# Patient Record
Sex: MALE | Race: WHITE | NOT HISPANIC OR LATINO | URBAN - METROPOLITAN AREA
[De-identification: names, ages, dates, MRNs, and addresses within clinical notes are randomized per-mention and may not be internally consistent; named-entity substitution may affect disease eponyms.]

---

## 2021-07-21 ENCOUNTER — OFFICE VISIT (OUTPATIENT)
Dept: URBAN - METROPOLITAN AREA CLINIC 27 | Facility: CLINIC | Age: 56
End: 2021-07-21

## 2021-07-21 PROBLEM — 422587007 NAUSEA: Status: ACTIVE | Noted: 2021-07-21

## 2021-07-21 PROBLEM — 14760008 CONSTIPATION: Status: ACTIVE | Noted: 2021-07-21

## 2021-07-22 ENCOUNTER — LAB OUTSIDE AN ENCOUNTER (OUTPATIENT)
Dept: URBAN - METROPOLITAN AREA CLINIC 121 | Facility: CLINIC | Age: 56
End: 2021-07-22

## 2021-07-22 LAB — ZZ-GE-UNK: NOT DETECTED

## 2021-09-15 ENCOUNTER — OFFICE VISIT (OUTPATIENT)
Dept: URBAN - METROPOLITAN AREA SURGERY CENTER 7 | Facility: SURGERY CENTER | Age: 56
End: 2021-09-15

## 2021-10-05 ENCOUNTER — OFFICE VISIT (OUTPATIENT)
Dept: URBAN - METROPOLITAN AREA CLINIC 27 | Facility: CLINIC | Age: 56
End: 2021-10-05

## 2021-10-05 PROBLEM — 3696007 FUNCTIONAL DYSPEPSIA: Status: ACTIVE | Noted: 2021-10-05

## 2021-10-12 ENCOUNTER — OFFICE VISIT (OUTPATIENT)
Dept: URBAN - METROPOLITAN AREA SURGERY CENTER 7 | Facility: SURGERY CENTER | Age: 56
End: 2021-10-12

## 2021-10-13 ENCOUNTER — LAB OUTSIDE AN ENCOUNTER (OUTPATIENT)
Dept: URBAN - METROPOLITAN AREA CLINIC 121 | Facility: CLINIC | Age: 56
End: 2021-10-13

## 2021-10-13 LAB
A/G RATIO: (no result)
ALBUMIN: 4.4
ALK PHOS: 56
AMYLASE: 20
BASOPH COUNT: (no result)
BASOPHIL %: 1.2
BG RANDOM: 136
BILI TOTAL: 1.3
BUN/CREAT: (no result)
BUN: 20
CALCIUM: 9.2
CHLORIDE: 103
CO2: 28
CREATININE: 1.15
EOS COUNT: (no result)
EOSINOPHIL %: 2
GLOBULIN TOT: (no result)
HCT: 36.9
HGB: 11.1
HGBA1C: (no result)
LYMPHS %: 22.9
MCH: 17.2
MCHC: (no result)
MCV: 57
MONOCYTE %: 8.9
MONOSCT AUTO: (no result)
PLATELETS: (no result)
PMN %: 65
POTASSIUM: 4.7
PROTEIN, TOT: 6.4
RBC: (no result)
RDW: 24.5
SGOT (AST): 24
SGPT (ALT): 25
WBC: (no result)
ZZ-GE-UNK: (no result)
ZZ-GE-UNK: (no result)

## 2021-10-26 ENCOUNTER — OFFICE VISIT (OUTPATIENT)
Dept: URBAN - METROPOLITAN AREA MEDICAL CENTER 17 | Facility: MEDICAL CENTER | Age: 56
End: 2021-10-26

## 2021-10-26 PROBLEM — 414916001 OBESITY: Status: ACTIVE | Noted: 2021-10-26

## 2022-04-30 ENCOUNTER — TELEPHONE ENCOUNTER (OUTPATIENT)
Dept: URBAN - METROPOLITAN AREA CLINIC 121 | Facility: CLINIC | Age: 57
End: 2022-04-30

## 2022-04-30 RX ORDER — ESOMEPRAZOLE MAGNESIUM 20 MG
1 PO QD CAPSULE,DELAYED RELEASE (ENTERIC COATED) ORAL
OUTPATIENT
Start: 2009-03-05 | End: 2013-10-24

## 2022-04-30 RX ORDER — LOSARTAN POTASSIUM 50 MG/1
TABLET, FILM COATED ORAL
OUTPATIENT
Start: 2008-09-08 | End: 2013-10-24

## 2022-04-30 RX ORDER — AZELASTINE HYDROCHLORIDE AND FLUTICASONE PROPIONATE 137; 50 UG/1; UG/1
BID SPRAY, METERED NASAL
OUTPATIENT
Start: 2015-06-08 | End: 2019-11-18

## 2022-04-30 RX ORDER — PANTOPRAZOLE SODIUM 40 MG/1
TAKE 1 TABLET BY MOUTH TWICE A DAY TABLET, DELAYED RELEASE ORAL
OUTPATIENT
Start: 2020-11-30 | End: 2021-09-10

## 2022-04-30 RX ORDER — CHOLESTYRAMINE 4 G/9G
MIX 1 PACK WITH JUICE AND DRINK TWICE A DAY FOR 2 WEEKS POWDER, FOR SUSPENSION ORAL
OUTPATIENT
Start: 2015-09-04 | End: 2019-11-18

## 2022-04-30 RX ORDER — POLYETHYLENE GLYCOL 3350, SODIUM SULFATE, SODIUM CHLORIDE, POTASSIUM CHLORIDE, ASCORBIC ACID, SODIUM ASCORBATE 7.5-2.691G
MIX AND USE AS DIRECTED KIT ORAL
OUTPATIENT
Start: 2013-10-24 | End: 2015-06-08

## 2022-04-30 RX ORDER — DEXLANSOPRAZOLE 60 MG/1
TAKE 1 CAPSULE PO QAM CAPSULE, DELAYED RELEASE ORAL
OUTPATIENT
Start: 2015-06-08 | End: 2019-11-18

## 2022-04-30 RX ORDER — SODIUM SULFATE, POTASSIUM SULFATE, MAGNESIUM SULFATE 17.5; 3.13; 1.6 G/ML; G/ML; G/ML
MIX AND USE AS DIRECTED. MAY SUB FOR GOLYTELY SOLUTION, CONCENTRATE ORAL
OUTPATIENT
Start: 2019-11-18

## 2022-04-30 RX ORDER — DEXLANSOPRAZOLE 60 MG/1
1 CAPSULE PO QAM CAPSULE, DELAYED RELEASE ORAL
OUTPATIENT
Start: 2013-10-24

## 2022-04-30 RX ORDER — FAMOTIDINE 10 MG
1 TABLET PO QHS TABLET ORAL
OUTPATIENT
Start: 2017-06-23

## 2022-04-30 RX ORDER — SAXAGLIPTIN 2.5 MG/1
QD TABLET, FILM COATED ORAL
OUTPATIENT
Start: 2013-11-18 | End: 2015-06-08

## 2022-04-30 RX ORDER — CHOLESTYRAMINE 4 G/9G
MIX 1 PACK WITH JUICE AND DRINK TWICE A DAY FOR 2 WEEKS POWDER, FOR SUSPENSION ORAL
OUTPATIENT
Start: 2015-09-04

## 2022-04-30 RX ORDER — LOSARTAN POTASSIUM 50 MG/1
TABLET, FILM COATED ORAL
OUTPATIENT
Start: 2008-09-08

## 2022-04-30 RX ORDER — FAMOTIDINE 10 MG
TAKE 1 TABLET TWICE A DAY TABLET ORAL
OUTPATIENT
Start: 2018-08-05 | End: 2019-11-18

## 2022-04-30 RX ORDER — PANTOPRAZOLE SODIUM 40 MG/1
1 TABLET PO BID TABLET, DELAYED RELEASE ORAL
OUTPATIENT
Start: 2019-11-18

## 2022-04-30 RX ORDER — SITAGLIPTIN 25 MG/1
TABLET, FILM COATED ORAL
OUTPATIENT
Start: 2008-09-08 | End: 2013-10-24

## 2022-04-30 RX ORDER — SUCRALFATE 1 G/10ML
2 TABLESPOONS PO BID X2 WEEKS SUSPENSION ORAL
OUTPATIENT
Start: 2015-07-20 | End: 2019-11-18

## 2022-04-30 RX ORDER — SODIUM SULFATE, POTASSIUM SULFATE, MAGNESIUM SULFATE 17.5; 3.13; 1.6 G/ML; G/ML; G/ML
MIX AND USE AS DIRECTED. MAY SUB FOR GOLYTELY SOLUTION, CONCENTRATE ORAL
OUTPATIENT
Start: 2019-11-18 | End: 2021-02-25

## 2022-04-30 RX ORDER — DEXLANSOPRAZOLE 60 MG/1
TAKE 1 CAPSULE PO QAM CAPSULE, DELAYED RELEASE ORAL
OUTPATIENT
Start: 2015-06-08

## 2022-04-30 RX ORDER — PANTOPRAZOLE SODIUM 40 MG/1
TAKE 1 TABLET PO QD TABLET, DELAYED RELEASE ORAL
OUTPATIENT
Start: 2017-06-02 | End: 2019-11-18

## 2022-04-30 RX ORDER — PANTOPRAZOLE SODIUM 40 MG/1
TAKE 1 TABLET PO QD TABLET, DELAYED RELEASE ORAL
OUTPATIENT
Start: 2017-06-02

## 2022-04-30 RX ORDER — SUCRALFATE 1 G/10ML
2 TABLESPOONS PO BID X2 WEEKS SUSPENSION ORAL
OUTPATIENT
Start: 2015-07-20

## 2022-04-30 RX ORDER — FAMOTIDINE 10 MG
1 TABLET PO BID TABLET ORAL
OUTPATIENT
Start: 2017-06-23 | End: 2019-11-18

## 2022-04-30 RX ORDER — SUCRALFATE 1 G/10ML
TAKE 1 TABLESPOON BY MOUTH THREE TIMES A DAY AS DIRECTED SUSPENSION ORAL
OUTPATIENT
Start: 2021-10-12 | End: 2021-11-09

## 2022-04-30 RX ORDER — DEXLANSOPRAZOLE 60 MG/1
TAKE 1 CAPSULE PO QD CAPSULE, DELAYED RELEASE ORAL
OUTPATIENT
Start: 2014-01-24

## 2022-04-30 RX ORDER — DEXLANSOPRAZOLE 60 MG/1
1 CAPSULE PO QAM CAPSULE, DELAYED RELEASE ORAL
OUTPATIENT
Start: 2013-10-24 | End: 2015-06-08

## 2022-04-30 RX ORDER — POLYETHYLENE GLYCOL 3350, SODIUM SULFATE, SODIUM CHLORIDE, POTASSIUM CHLORIDE, ASCORBIC ACID, SODIUM ASCORBATE 7.5-2.691G
MIX AND USE AS DIRECTED KIT ORAL
OUTPATIENT
Start: 2013-10-24

## 2022-04-30 RX ORDER — LOSARTAN POTASSIUM 100 MG/1
QD TABLET, FILM COATED ORAL
OUTPATIENT
Start: 2013-10-24 | End: 2019-11-18

## 2022-04-30 RX ORDER — GLIPIZIDE 5 MG/1
TID TABLET, EXTENDED RELEASE ORAL
OUTPATIENT
Start: 2013-10-24

## 2022-04-30 RX ORDER — PANTOPRAZOLE SODIUM 40 MG/1
TAKE 1 TABLET BY MOUTH TWICE A DAY TABLET, DELAYED RELEASE ORAL
OUTPATIENT
Start: 2020-11-30

## 2022-04-30 RX ORDER — CETIRIZINE HYDROCHLORIDE 10 MG/1
TABLET, FILM COATED ORAL
OUTPATIENT
Start: 2008-09-08 | End: 2013-10-24

## 2022-04-30 RX ORDER — DICYCLOMINE HYDROCHLORIDE 10 MG/1
TAKE 1 CAPSULE BY MOUTH THREE TIMES A DAY CAPSULE ORAL
OUTPATIENT
Start: 2021-09-03 | End: 2022-01-01

## 2022-04-30 RX ORDER — GLIPIZIDE 5 MG/1
TID TABLET, EXTENDED RELEASE ORAL
OUTPATIENT
Start: 2013-10-24 | End: 2015-06-08

## 2022-04-30 RX ORDER — CETIRIZINE HYDROCHLORIDE 10 MG/1
TABLET, FILM COATED ORAL
OUTPATIENT
Start: 2008-09-08

## 2022-04-30 RX ORDER — LOSARTAN POTASSIUM 100 MG/1
QD TABLET, FILM COATED ORAL
OUTPATIENT
Start: 2013-10-24

## 2022-04-30 RX ORDER — FAMOTIDINE 10 MG
1 TABLET PO QHS TABLET ORAL
OUTPATIENT
Start: 2015-06-08 | End: 2019-11-18

## 2022-04-30 RX ORDER — FAMOTIDINE 10 MG
1 TABLET PO QHS TABLET ORAL
OUTPATIENT
Start: 2015-06-08

## 2022-04-30 RX ORDER — SITAGLIPTIN 25 MG/1
TABLET, FILM COATED ORAL
OUTPATIENT
Start: 2008-09-08

## 2022-04-30 RX ORDER — ESOMEPRAZOLE MAGNESIUM 20 MG
1 PO QD CAPSULE,DELAYED RELEASE (ENTERIC COATED) ORAL
OUTPATIENT
Start: 2009-03-05

## 2022-04-30 RX ORDER — SAXAGLIPTIN 2.5 MG/1
QD TABLET, FILM COATED ORAL
OUTPATIENT
Start: 2013-11-18

## 2022-04-30 RX ORDER — AZELASTINE HYDROCHLORIDE AND FLUTICASONE PROPIONATE 137; 50 UG/1; UG/1
BID SPRAY, METERED NASAL
OUTPATIENT
Start: 2015-06-08

## 2022-04-30 RX ORDER — FAMOTIDINE 10 MG
TAKE 1 TABLET TWICE A DAY TABLET ORAL
OUTPATIENT
Start: 2018-08-05

## 2022-05-01 ENCOUNTER — TELEPHONE ENCOUNTER (OUTPATIENT)
Dept: URBAN - METROPOLITAN AREA CLINIC 121 | Facility: CLINIC | Age: 57
End: 2022-05-01

## 2022-05-01 RX ORDER — LINACLOTIDE 145 UG/1
1 CAPSULE PO QAM 30 MINS BEFORE EATING CAPSULE, GELATIN COATED ORAL
Status: ACTIVE | COMMUNITY
Start: 2021-07-21

## 2022-05-01 RX ORDER — FAMOTIDINE 20 MG/1
TABLET ORAL
Status: ACTIVE | COMMUNITY
Start: 2019-11-18

## 2022-05-01 RX ORDER — MONTELUKAST 10 MG/1
QD TABLET, FILM COATED ORAL
Status: ACTIVE | COMMUNITY
Start: 2015-06-08

## 2022-05-01 RX ORDER — INSULIN GLARGINE 100 [IU]/ML
60 UNITS QD INJECTION, SOLUTION SUBCUTANEOUS
Status: ACTIVE | COMMUNITY
Start: 2015-06-08

## 2022-05-01 RX ORDER — SEMAGLUTIDE 1.34 MG/ML
INJECTION, SOLUTION SUBCUTANEOUS
Status: ACTIVE | COMMUNITY
Start: 2021-07-21

## 2022-05-01 RX ORDER — POLYETHYLENE GLYCOL 3350, SODIUM SULFATE, SODIUM CHLORIDE, POTASSIUM CHLORIDE, ASCORBIC ACID, SODIUM ASCORBATE 140-9-5.2G
MIX AND USE DIRECTED.   USE CO-PAY CARD BELOW:  BIN 019158  PCN: CNRX GROUP: AC68037003 ID: 39275793763 KIT ORAL
Status: ACTIVE | COMMUNITY
Start: 2021-08-19

## 2022-05-01 RX ORDER — OLMESARTAN MEDOXOMIL 40 MG/1
TABLET, FILM COATED ORAL
Status: ACTIVE | COMMUNITY
Start: 2019-11-18

## 2022-05-01 RX ORDER — GABAPENTIN 300 MG/1
BID CAPSULE ORAL
Status: ACTIVE | COMMUNITY
Start: 2015-06-08

## 2022-05-01 RX ORDER — POLYETHYLENE GLYCOL 3350, SODIUM SULFATE, SODIUM CHLORIDE, POTASSIUM CHLORIDE, ASCORBIC ACID, SODIUM ASCORBATE 140-9-5.2G
MIX AND USE DIRECTED.   USE CO-PAY CARD BELOW:  BIN 019158  PCN: CNRX GROUP: AC68037003 ID: 39275793763 KIT ORAL
Status: ACTIVE | COMMUNITY
Start: 2021-07-21

## 2022-05-01 RX ORDER — ALLOPURINOL 100 MG/1
TABLET ORAL
Status: ACTIVE | COMMUNITY
Start: 2019-11-18

## 2022-05-01 RX ORDER — ROSUVASTATIN CALCIUM 10 MG
TABLET ORAL
Status: ACTIVE | COMMUNITY
Start: 2021-07-21

## 2022-05-01 RX ORDER — INSULIN GLARGINE 100 [IU]/ML
INJECTION, SOLUTION SUBCUTANEOUS
Status: ACTIVE | COMMUNITY
Start: 2021-07-21

## 2022-05-01 RX ORDER — OMEGA-3S/DHA/EPA/FISH OIL 980-1400MG
CAPSULE,DELAYED RELEASE (ENTERIC COATED) ORAL
Status: ACTIVE | COMMUNITY
Start: 2021-07-21

## 2022-05-01 RX ORDER — OMEPRAZOLE 10 MG/1
CAPSULE, DELAYED RELEASE ORAL
Status: ACTIVE | COMMUNITY
Start: 2019-11-18

## 2022-05-01 RX ORDER — CHLORHEXIDINE GLUCONATE 4 %
LIQUID (ML) TOPICAL
Status: ACTIVE | COMMUNITY
Start: 2021-07-21

## 2022-05-01 RX ORDER — YOHIMBE BARK 500 MG
CAPSULE ORAL
Status: ACTIVE | COMMUNITY
Start: 2021-07-21

## 2022-05-01 RX ORDER — DILTIAZEM HYDROCHLORIDE 240 MG/1
CAPSULE, EXTENDED RELEASE ORAL
Status: ACTIVE | COMMUNITY
Start: 2019-11-18

## 2022-05-01 RX ORDER — PANTOPRAZOLE SODIUM 40 MG/1
TAKE 1 TABLET BY MOUTH TWICE A DAY AS DIRECTED TABLET, DELAYED RELEASE ORAL
Status: ACTIVE | COMMUNITY
Start: 2021-09-10 | End: 2022-09-05

## 2022-05-01 RX ORDER — GLIMEPIRIDE 4 MG/1
TABLET ORAL
Status: ACTIVE | COMMUNITY
Start: 2019-11-18

## 2022-05-01 RX ORDER — FUROSEMIDE 20 MG/1
QD TABLET ORAL
Status: ACTIVE | COMMUNITY
Start: 2015-06-08

## 2022-05-01 RX ORDER — METFORMIN HCL 1000 MG/1
BID TABLET ORAL
Status: ACTIVE | COMMUNITY
Start: 2013-10-24

## 2022-11-28 ENCOUNTER — ERX REFILL RESPONSE (OUTPATIENT)
Dept: URBAN - METROPOLITAN AREA CLINIC 27 | Facility: CLINIC | Age: 57
End: 2022-11-28

## 2022-11-28 RX ORDER — PANTOPRAZOLE SODIUM 40 MG/1
TAKE 1 TABLET BY MOUTH TWICE A DAY AS DIRECTED TABLET, DELAYED RELEASE ORAL
Qty: 180 TABLET | Refills: 4 | OUTPATIENT

## 2022-11-28 RX ORDER — PANTOPRAZOLE SODIUM 40 MG/1
TAKE 1 TABLET BY MOUTH TWICE A DAY AS DIRECTED TABLET, DELAYED RELEASE ORAL
Qty: 180 TABLET | Refills: 3 | OUTPATIENT

## 2023-03-03 ENCOUNTER — TELEPHONE ENCOUNTER (OUTPATIENT)
Dept: URBAN - METROPOLITAN AREA CLINIC 27 | Facility: CLINIC | Age: 58
End: 2023-03-03

## 2023-05-01 ENCOUNTER — LAB OUTSIDE AN ENCOUNTER (OUTPATIENT)
Dept: URBAN - METROPOLITAN AREA CLINIC 27 | Facility: CLINIC | Age: 58
End: 2023-05-01

## 2023-05-01 ENCOUNTER — OFFICE VISIT (OUTPATIENT)
Dept: URBAN - METROPOLITAN AREA CLINIC 27 | Facility: CLINIC | Age: 58
End: 2023-05-01
Payer: COMMERCIAL

## 2023-05-01 ENCOUNTER — WEB ENCOUNTER (OUTPATIENT)
Dept: URBAN - METROPOLITAN AREA CLINIC 27 | Facility: CLINIC | Age: 58
End: 2023-05-01

## 2023-05-01 VITALS
BODY MASS INDEX: 38.5 KG/M2 | SYSTOLIC BLOOD PRESSURE: 124 MMHG | WEIGHT: 275 LBS | DIASTOLIC BLOOD PRESSURE: 54 MMHG | HEART RATE: 76 BPM | HEIGHT: 71 IN

## 2023-05-01 DIAGNOSIS — E11.9 TYPE 2 DIABETES MELLITUS WITHOUT COMPLICATIONS: ICD-10-CM

## 2023-05-01 DIAGNOSIS — K21.9 GASTRO-ESOPHAGEAL REFLUX DISEASE WITHOUT ESOPHAGITIS: ICD-10-CM

## 2023-05-01 DIAGNOSIS — I10 ESSENTIAL (PRIMARY) HYPERTENSION: ICD-10-CM

## 2023-05-01 PROCEDURE — 99214 OFFICE O/P EST MOD 30 MIN: CPT | Performed by: INTERNAL MEDICINE

## 2023-05-01 RX ORDER — SEMAGLUTIDE 1.34 MG/ML
INJECTION, SOLUTION SUBCUTANEOUS
Status: ACTIVE | COMMUNITY
Start: 2021-07-21

## 2023-05-01 RX ORDER — ROSUVASTATIN CALCIUM 10 MG
TABLET ORAL
Status: ACTIVE | COMMUNITY
Start: 2021-07-21

## 2023-05-01 RX ORDER — INSULIN GLARGINE 100 [IU]/ML
INJECTION, SOLUTION SUBCUTANEOUS
Status: ACTIVE | COMMUNITY
Start: 2021-07-21

## 2023-05-01 RX ORDER — OLMESARTAN MEDOXOMIL 40 MG/1
TABLET, FILM COATED ORAL
Status: ACTIVE | COMMUNITY
Start: 2019-11-18

## 2023-05-01 RX ORDER — GABAPENTIN 300 MG/1
BID CAPSULE ORAL
Status: ACTIVE | COMMUNITY
Start: 2015-06-08

## 2023-05-01 RX ORDER — POLYETHYLENE GLYCOL 3350, SODIUM SULFATE, SODIUM CHLORIDE, POTASSIUM CHLORIDE, ASCORBIC ACID, SODIUM ASCORBATE 140-9-5.2G
MIX AND USE DIRECTED.   USE CO-PAY CARD BELOW:  BIN 019158  PCN: CNRX GROUP: AC68037003 ID: 39275793763 KIT ORAL
Status: ACTIVE | COMMUNITY
Start: 2021-08-19

## 2023-05-01 RX ORDER — DILTIAZEM HYDROCHLORIDE 240 MG/1
CAPSULE, EXTENDED RELEASE ORAL
Status: ACTIVE | COMMUNITY
Start: 2019-11-18

## 2023-05-01 RX ORDER — FAMOTIDINE 20 MG/1
TABLET ORAL
Status: ACTIVE | COMMUNITY
Start: 2019-11-18

## 2023-05-01 RX ORDER — METFORMIN HCL 1000 MG/1
BID TABLET ORAL
Status: ACTIVE | COMMUNITY
Start: 2013-10-24

## 2023-05-01 RX ORDER — MONTELUKAST 10 MG/1
QD TABLET, FILM COATED ORAL
Status: ACTIVE | COMMUNITY
Start: 2015-06-08

## 2023-05-01 RX ORDER — PANTOPRAZOLE SODIUM 40 MG/1
TAKE 1 TABLET BY MOUTH TWICE A DAY AS DIRECTED TABLET, DELAYED RELEASE ORAL
Qty: 180 TABLET | Refills: 3 | Status: ACTIVE | COMMUNITY

## 2023-05-01 RX ORDER — FUROSEMIDE 20 MG/1
QD TABLET ORAL
Status: ACTIVE | COMMUNITY
Start: 2015-06-08

## 2023-05-01 RX ORDER — CHLORHEXIDINE GLUCONATE 4 %
LIQUID (ML) TOPICAL
Status: ACTIVE | COMMUNITY
Start: 2021-07-21

## 2023-05-01 RX ORDER — PANTOPRAZOLE SODIUM 40 MG/1
1 TABLET TABLET, DELAYED RELEASE ORAL TWICE A DAY
Qty: 120 TABLET | Refills: 3 | OUTPATIENT
Start: 2023-05-01

## 2023-05-01 RX ORDER — YOHIMBE BARK 500 MG
CAPSULE ORAL
Status: ACTIVE | COMMUNITY
Start: 2021-07-21

## 2023-05-01 RX ORDER — LINACLOTIDE 145 UG/1
1 CAPSULE PO QAM 30 MINS BEFORE EATING CAPSULE, GELATIN COATED ORAL
Status: ACTIVE | COMMUNITY
Start: 2021-07-21

## 2023-05-01 RX ORDER — OMEGA-3S/DHA/EPA/FISH OIL 980-1400MG
CAPSULE,DELAYED RELEASE (ENTERIC COATED) ORAL
Status: ACTIVE | COMMUNITY
Start: 2021-07-21

## 2023-05-01 RX ORDER — INSULIN GLARGINE 100 [IU]/ML
60 UNITS QD INJECTION, SOLUTION SUBCUTANEOUS
Status: ACTIVE | COMMUNITY
Start: 2015-06-08

## 2023-05-01 RX ORDER — OMEPRAZOLE 10 MG/1
CAPSULE, DELAYED RELEASE ORAL
Status: ACTIVE | COMMUNITY
Start: 2019-11-18

## 2023-05-01 RX ORDER — GLIMEPIRIDE 4 MG/1
TABLET ORAL
Status: DISCONTINUED | COMMUNITY
Start: 2019-11-18

## 2023-05-01 RX ORDER — ALLOPURINOL 100 MG/1
TABLET ORAL
Status: ACTIVE | COMMUNITY
Start: 2019-11-18

## 2023-05-01 NOTE — HPI-TODAY'S VISIT:
Mr. Chris presents today for routine follow-up of his heartburn. He has run out of medications and is having severe heartburn symptoms.  He currently takes pantoprazole twice daily.  Otherwise, he denies any other gastrointestinal issues.  He reports it has been a while since his last endoscopic evaluation.

## 2023-05-16 ENCOUNTER — CLAIMS CREATED FROM THE CLAIM WINDOW (OUTPATIENT)
Dept: URBAN - METROPOLITAN AREA CLINIC 4 | Facility: CLINIC | Age: 58
End: 2023-05-16
Payer: COMMERCIAL

## 2023-05-16 ENCOUNTER — TELEPHONE ENCOUNTER (OUTPATIENT)
Dept: URBAN - METROPOLITAN AREA CLINIC 27 | Facility: CLINIC | Age: 58
End: 2023-05-16

## 2023-05-16 ENCOUNTER — WEB ENCOUNTER (OUTPATIENT)
Dept: URBAN - METROPOLITAN AREA CLINIC 27 | Facility: CLINIC | Age: 58
End: 2023-05-16

## 2023-05-16 ENCOUNTER — CLAIMS CREATED FROM THE CLAIM WINDOW (OUTPATIENT)
Dept: URBAN - METROPOLITAN AREA SURGERY CENTER 7 | Facility: SURGERY CENTER | Age: 58
End: 2023-05-16

## 2023-05-16 ENCOUNTER — CLAIMS CREATED FROM THE CLAIM WINDOW (OUTPATIENT)
Dept: URBAN - METROPOLITAN AREA SURGERY CENTER 7 | Facility: SURGERY CENTER | Age: 58
End: 2023-05-16
Payer: COMMERCIAL

## 2023-05-16 DIAGNOSIS — K21.9 GASTRO-ESOPHAGEAL REFLUX DISEASE WITHOUT ESOPHAGITIS: ICD-10-CM

## 2023-05-16 DIAGNOSIS — K21.9 ACID REFLUX: ICD-10-CM

## 2023-05-16 DIAGNOSIS — R12 BURNING REFLUX: ICD-10-CM

## 2023-05-16 PROCEDURE — 43239 EGD BIOPSY SINGLE/MULTIPLE: CPT | Performed by: INTERNAL MEDICINE

## 2023-05-16 PROCEDURE — 88312 SPECIAL STAINS GROUP 1: CPT | Performed by: PATHOLOGY

## 2023-05-16 PROCEDURE — G8907 PT DOC NO EVENTS ON DISCHARG: HCPCS | Performed by: INTERNAL MEDICINE

## 2023-05-16 PROCEDURE — 88305 TISSUE EXAM BY PATHOLOGIST: CPT | Performed by: PATHOLOGY

## 2023-05-16 RX ORDER — GABAPENTIN 300 MG/1
BID CAPSULE ORAL
Status: ACTIVE | COMMUNITY
Start: 2015-06-08

## 2023-05-16 RX ORDER — METFORMIN HCL 1000 MG/1
BID TABLET ORAL
Status: ACTIVE | COMMUNITY
Start: 2013-10-24

## 2023-05-16 RX ORDER — INSULIN GLARGINE 100 [IU]/ML
INJECTION, SOLUTION SUBCUTANEOUS
Status: ACTIVE | COMMUNITY
Start: 2021-07-21

## 2023-05-16 RX ORDER — ALLOPURINOL 100 MG/1
TABLET ORAL
Status: ACTIVE | COMMUNITY
Start: 2019-11-18

## 2023-05-16 RX ORDER — CHLORHEXIDINE GLUCONATE 4 %
LIQUID (ML) TOPICAL
Status: ACTIVE | COMMUNITY
Start: 2021-07-21

## 2023-05-16 RX ORDER — SEMAGLUTIDE 1.34 MG/ML
INJECTION, SOLUTION SUBCUTANEOUS
Status: ACTIVE | COMMUNITY
Start: 2021-07-21

## 2023-05-16 RX ORDER — INSULIN GLARGINE 100 [IU]/ML
60 UNITS QD INJECTION, SOLUTION SUBCUTANEOUS
Status: ACTIVE | COMMUNITY
Start: 2015-06-08

## 2023-05-16 RX ORDER — POLYETHYLENE GLYCOL 3350, SODIUM SULFATE, SODIUM CHLORIDE, POTASSIUM CHLORIDE, ASCORBIC ACID, SODIUM ASCORBATE 140-9-5.2G
MIX AND USE DIRECTED.   USE CO-PAY CARD BELOW:  BIN 019158  PCN: CNRX GROUP: AC68037003 ID: 39275793763 KIT ORAL
Status: ACTIVE | COMMUNITY
Start: 2021-08-19

## 2023-05-16 RX ORDER — YOHIMBE BARK 500 MG
CAPSULE ORAL
Status: ACTIVE | COMMUNITY
Start: 2021-07-21

## 2023-05-16 RX ORDER — PANTOPRAZOLE SODIUM 40 MG/1
TAKE 1 TABLET BY MOUTH TWICE A DAY AS DIRECTED TABLET, DELAYED RELEASE ORAL
Qty: 180 TABLET | Refills: 3 | Status: ACTIVE | COMMUNITY

## 2023-05-16 RX ORDER — OMEGA-3S/DHA/EPA/FISH OIL 980-1400MG
CAPSULE,DELAYED RELEASE (ENTERIC COATED) ORAL
Status: ACTIVE | COMMUNITY
Start: 2021-07-21

## 2023-05-16 RX ORDER — FAMOTIDINE 20 MG/1
TABLET ORAL
Status: ACTIVE | COMMUNITY
Start: 2019-11-18

## 2023-05-16 RX ORDER — LINACLOTIDE 145 UG/1
1 CAPSULE PO QAM 30 MINS BEFORE EATING CAPSULE, GELATIN COATED ORAL
Status: ACTIVE | COMMUNITY
Start: 2021-07-21

## 2023-05-16 RX ORDER — DILTIAZEM HYDROCHLORIDE 240 MG/1
CAPSULE, EXTENDED RELEASE ORAL
Status: ACTIVE | COMMUNITY
Start: 2019-11-18

## 2023-05-16 RX ORDER — OMEPRAZOLE 10 MG/1
CAPSULE, DELAYED RELEASE ORAL
Status: ACTIVE | COMMUNITY
Start: 2019-11-18

## 2023-05-16 RX ORDER — MONTELUKAST 10 MG/1
QD TABLET, FILM COATED ORAL
Status: ACTIVE | COMMUNITY
Start: 2015-06-08

## 2023-05-16 RX ORDER — PANTOPRAZOLE SODIUM 40 MG/1
1 TABLET TABLET, DELAYED RELEASE ORAL TWICE A DAY
Qty: 120 TABLET | Refills: 3 | Status: ACTIVE | COMMUNITY
Start: 2023-05-01

## 2023-05-16 RX ORDER — PANTOPRAZOLE SODIUM 40 MG/1
1 TABLET TABLET, DELAYED RELEASE ORAL ONCE A DAY
Qty: 60 TABLET | Refills: 3 | OUTPATIENT
Start: 2023-05-16

## 2023-05-16 RX ORDER — PANTOPRAZOLE SODIUM 40 MG/1
1 TABLET TABLET, DELAYED RELEASE ORAL ONCE A DAY
Qty: 60 TABLET | Refills: 3
Start: 2023-05-16

## 2023-05-16 RX ORDER — FUROSEMIDE 20 MG/1
QD TABLET ORAL
Status: ACTIVE | COMMUNITY
Start: 2015-06-08

## 2023-05-16 RX ORDER — ROSUVASTATIN CALCIUM 10 MG
TABLET ORAL
Status: ACTIVE | COMMUNITY
Start: 2021-07-21

## 2023-05-16 RX ORDER — OLMESARTAN MEDOXOMIL 40 MG/1
TABLET, FILM COATED ORAL
Status: ACTIVE | COMMUNITY
Start: 2019-11-18

## 2023-05-22 ENCOUNTER — TELEPHONE ENCOUNTER (OUTPATIENT)
Dept: URBAN - METROPOLITAN AREA CLINIC 27 | Facility: CLINIC | Age: 58
End: 2023-05-22

## 2023-05-22 RX ORDER — PANTOPRAZOLE SODIUM 40 MG/1
1 TABLET TABLET, DELAYED RELEASE ORAL TWICE A DAY
Qty: 120 TABLET | Refills: 3 | OUTPATIENT
Start: 2023-05-22

## 2023-05-24 ENCOUNTER — TELEPHONE ENCOUNTER (OUTPATIENT)
Dept: URBAN - METROPOLITAN AREA CLINIC 27 | Facility: CLINIC | Age: 58
End: 2023-05-24

## 2023-05-24 RX ORDER — PANTOPRAZOLE SODIUM 40 MG/1
1 TABLET TABLET, DELAYED RELEASE ORAL ONCE A DAY
Qty: 60 TABLET | Refills: 3
Start: 2023-05-16

## 2023-07-10 ENCOUNTER — DASHBOARD ENCOUNTERS (OUTPATIENT)
Age: 58
End: 2023-07-10

## 2023-07-10 ENCOUNTER — OFFICE VISIT (OUTPATIENT)
Dept: URBAN - METROPOLITAN AREA CLINIC 27 | Facility: CLINIC | Age: 58
End: 2023-07-10
Payer: COMMERCIAL

## 2023-07-10 VITALS
DIASTOLIC BLOOD PRESSURE: 55 MMHG | SYSTOLIC BLOOD PRESSURE: 117 MMHG | WEIGHT: 270 LBS | HEIGHT: 71 IN | BODY MASS INDEX: 37.8 KG/M2 | HEART RATE: 73 BPM

## 2023-07-10 DIAGNOSIS — K21.9 GASTRO-ESOPHAGEAL REFLUX DISEASE WITHOUT ESOPHAGITIS: ICD-10-CM

## 2023-07-10 DIAGNOSIS — K31.89 RETAINED FOOD IN STOMACH: ICD-10-CM

## 2023-07-10 PROBLEM — 386211005: Status: ACTIVE | Noted: 2023-07-10

## 2023-07-10 PROCEDURE — 99214 OFFICE O/P EST MOD 30 MIN: CPT | Performed by: INTERNAL MEDICINE

## 2023-07-10 RX ORDER — POLYETHYLENE GLYCOL 3350, SODIUM SULFATE, SODIUM CHLORIDE, POTASSIUM CHLORIDE, ASCORBIC ACID, SODIUM ASCORBATE 140-9-5.2G
MIX AND USE DIRECTED.   USE CO-PAY CARD BELOW:  BIN 019158  PCN: CNRX GROUP: AC68037003 ID: 39275793763 KIT ORAL
Status: ACTIVE | COMMUNITY
Start: 2021-08-19

## 2023-07-10 RX ORDER — PANTOPRAZOLE SODIUM 40 MG/1
TAKE 1 TABLET BY MOUTH TWICE A DAY AS DIRECTED TABLET, DELAYED RELEASE ORAL
Qty: 180 TABLET | Refills: 3 | Status: ACTIVE | COMMUNITY

## 2023-07-10 RX ORDER — OLMESARTAN MEDOXOMIL 40 MG/1
TABLET, FILM COATED ORAL
Status: ACTIVE | COMMUNITY
Start: 2019-11-18

## 2023-07-10 RX ORDER — LINACLOTIDE 145 UG/1
1 CAPSULE PO QAM 30 MINS BEFORE EATING CAPSULE, GELATIN COATED ORAL
Status: ACTIVE | COMMUNITY
Start: 2021-07-21

## 2023-07-10 RX ORDER — PANTOPRAZOLE SODIUM 40 MG/1
1 TABLET TABLET, DELAYED RELEASE ORAL TWICE A DAY
Qty: 120 TABLET | Refills: 3 | Status: ACTIVE | COMMUNITY
Start: 2023-05-22

## 2023-07-10 RX ORDER — SEMAGLUTIDE 1.34 MG/ML
INJECTION, SOLUTION SUBCUTANEOUS
Status: ACTIVE | COMMUNITY
Start: 2021-07-21

## 2023-07-10 RX ORDER — ALLOPURINOL 100 MG/1
TABLET ORAL
Status: ACTIVE | COMMUNITY
Start: 2019-11-18

## 2023-07-10 RX ORDER — INSULIN GLARGINE 100 [IU]/ML
INJECTION, SOLUTION SUBCUTANEOUS
Status: ACTIVE | COMMUNITY
Start: 2021-07-21

## 2023-07-10 RX ORDER — FUROSEMIDE 20 MG/1
QD TABLET ORAL
Status: ACTIVE | COMMUNITY
Start: 2015-06-08

## 2023-07-10 RX ORDER — INSULIN GLARGINE 100 [IU]/ML
60 UNITS QD INJECTION, SOLUTION SUBCUTANEOUS
Status: ACTIVE | COMMUNITY
Start: 2015-06-08

## 2023-07-10 RX ORDER — OMEPRAZOLE 10 MG/1
CAPSULE, DELAYED RELEASE ORAL
Status: ACTIVE | COMMUNITY
Start: 2019-11-18

## 2023-07-10 RX ORDER — OMEGA-3S/DHA/EPA/FISH OIL 980-1400MG
CAPSULE,DELAYED RELEASE (ENTERIC COATED) ORAL
Status: ACTIVE | COMMUNITY
Start: 2021-07-21

## 2023-07-10 RX ORDER — PANTOPRAZOLE SODIUM 40 MG/1
1 TABLET TABLET, DELAYED RELEASE ORAL ONCE A DAY
Qty: 60 TABLET | Refills: 3 | Status: ACTIVE | COMMUNITY
Start: 2023-05-16

## 2023-07-10 RX ORDER — DILTIAZEM HYDROCHLORIDE 240 MG/1
CAPSULE, EXTENDED RELEASE ORAL
Status: ACTIVE | COMMUNITY
Start: 2019-11-18

## 2023-07-10 RX ORDER — CHLORHEXIDINE GLUCONATE 4 %
LIQUID (ML) TOPICAL
Status: ACTIVE | COMMUNITY
Start: 2021-07-21

## 2023-07-10 RX ORDER — YOHIMBE BARK 500 MG
CAPSULE ORAL
Status: ACTIVE | COMMUNITY
Start: 2021-07-21

## 2023-07-10 RX ORDER — GABAPENTIN 300 MG/1
BID CAPSULE ORAL
Status: ACTIVE | COMMUNITY
Start: 2015-06-08

## 2023-07-10 RX ORDER — METFORMIN HCL 1000 MG/1
BID TABLET ORAL
Status: ACTIVE | COMMUNITY
Start: 2013-10-24

## 2023-07-10 RX ORDER — ROSUVASTATIN CALCIUM 10 MG
TABLET ORAL
Status: ACTIVE | COMMUNITY
Start: 2021-07-21

## 2023-07-10 RX ORDER — PANTOPRAZOLE SODIUM 40 MG/1
1 TABLET TABLET, DELAYED RELEASE ORAL TWICE A DAY
Qty: 120 TABLET | Refills: 3 | Status: ACTIVE | COMMUNITY
Start: 2023-05-01

## 2023-07-10 RX ORDER — FAMOTIDINE 20 MG/1
TABLET ORAL
Status: ACTIVE | COMMUNITY
Start: 2019-11-18

## 2023-07-10 RX ORDER — MONTELUKAST 10 MG/1
QD TABLET, FILM COATED ORAL
Status: ACTIVE | COMMUNITY
Start: 2015-06-08

## 2023-07-10 NOTE — HPI-TODAY'S VISIT:
presents today for follow-up since his recent upper endoscopy for GERD.  His biopsies revealed reflux disease.  However, he was noted to have a significant amount of food in his stomach during his endoscopy.  He reports that he did have a gastric emptying study.  He also informed me that he is taking the diabetes medication Ozempic that may be affecting his gastric emptying.  Otherwise, he has no other complaints.

## 2023-11-27 ENCOUNTER — ERX REFILL RESPONSE (OUTPATIENT)
Dept: URBAN - METROPOLITAN AREA CLINIC 27 | Facility: CLINIC | Age: 58
End: 2023-11-27

## 2023-11-27 RX ORDER — PANTOPRAZOLE SODIUM 40 MG/1
1 TABLET TABLET, DELAYED RELEASE ORAL ONCE A DAY
Qty: 60 TABLET | Refills: 3 | OUTPATIENT

## 2023-11-27 RX ORDER — PANTOPRAZOLE SODIUM 40 MG/1
1 TABLET TABLET, DELAYED RELEASE ORAL ONCE A DAY
Qty: 30 | Refills: 5 | OUTPATIENT

## 2024-08-28 ENCOUNTER — TELEPHONE ENCOUNTER (OUTPATIENT)
Dept: URBAN - METROPOLITAN AREA CLINIC 27 | Facility: CLINIC | Age: 59
End: 2024-08-28

## 2025-04-21 ENCOUNTER — ERX REFILL RESPONSE (OUTPATIENT)
Dept: URBAN - METROPOLITAN AREA CLINIC 27 | Facility: CLINIC | Age: 60
End: 2025-04-21

## 2025-04-21 RX ORDER — PANTOPRAZOLE SODIUM 40 MG/1
TAKE 1 TABLET BY MOUTH TWICE  DAILY TABLET, DELAYED RELEASE ORAL
Qty: 180 TABLET | Refills: 3

## 2025-05-27 ENCOUNTER — OFFICE VISIT (OUTPATIENT)
Dept: URBAN - METROPOLITAN AREA CLINIC 27 | Facility: CLINIC | Age: 60
End: 2025-05-27
Payer: COMMERCIAL

## 2025-05-27 DIAGNOSIS — K59.00 CONSTIPATION, UNSPECIFIED: ICD-10-CM

## 2025-05-27 PROCEDURE — 99214 OFFICE O/P EST MOD 30 MIN: CPT | Performed by: INTERNAL MEDICINE

## 2025-05-27 RX ORDER — INSULIN GLARGINE 100 [IU]/ML
INJECTION, SOLUTION SUBCUTANEOUS
Status: ACTIVE | COMMUNITY
Start: 2021-07-21

## 2025-05-27 RX ORDER — OMEGA-3S/DHA/EPA/FISH OIL 980-1400MG
CAPSULE,DELAYED RELEASE (ENTERIC COATED) ORAL
Status: ACTIVE | COMMUNITY
Start: 2021-07-21

## 2025-05-27 RX ORDER — GABAPENTIN 300 MG/1
BID CAPSULE ORAL
Status: ACTIVE | COMMUNITY
Start: 2015-06-08

## 2025-05-27 RX ORDER — MONTELUKAST 10 MG/1
QD TABLET, FILM COATED ORAL
Status: ACTIVE | COMMUNITY
Start: 2015-06-08

## 2025-05-27 RX ORDER — INSULIN GLARGINE 100 [IU]/ML
60 UNITS QD INJECTION, SOLUTION SUBCUTANEOUS
Status: ACTIVE | COMMUNITY
Start: 2015-06-08

## 2025-05-27 RX ORDER — POLYETHYLENE GLYCOL-3350 AND ELECTROLYTES 236; 6.74; 5.86; 2.97; 22.74 G/274.31G; G/274.31G; G/274.31G; G/274.31G; G/274.31G
AS DIRECTED ML POWDER, FOR SOLUTION ORAL ONCE
Qty: 4000 | Refills: 1 | OUTPATIENT
Start: 2025-05-27 | End: 2025-05-29

## 2025-05-27 RX ORDER — SEMAGLUTIDE 1.34 MG/ML
INJECTION, SOLUTION SUBCUTANEOUS
Status: ACTIVE | COMMUNITY
Start: 2021-07-21

## 2025-05-27 RX ORDER — FUROSEMIDE 20 MG/1
QD TABLET ORAL
Status: ACTIVE | COMMUNITY
Start: 2015-06-08

## 2025-05-27 RX ORDER — METFORMIN HCL 1000 MG/1
BID TABLET ORAL
Status: ACTIVE | COMMUNITY
Start: 2013-10-24

## 2025-05-27 RX ORDER — ROSUVASTATIN CALCIUM 10 MG
TABLET ORAL
Status: ACTIVE | COMMUNITY
Start: 2021-07-21

## 2025-05-27 RX ORDER — CHLORHEXIDINE GLUCONATE 4 %
LIQUID (ML) TOPICAL
Status: ACTIVE | COMMUNITY
Start: 2021-07-21

## 2025-05-27 RX ORDER — FAMOTIDINE 20 MG/1
TABLET ORAL
Status: ACTIVE | COMMUNITY
Start: 2019-11-18

## 2025-05-27 RX ORDER — DILTIAZEM HYDROCHLORIDE 240 MG/1
CAPSULE, EXTENDED RELEASE ORAL
Status: ACTIVE | COMMUNITY
Start: 2019-11-18

## 2025-05-27 RX ORDER — PANTOPRAZOLE SODIUM 40 MG/1
TAKE 1 TABLET BY MOUTH TWICE  DAILY TABLET, DELAYED RELEASE ORAL
Qty: 180 TABLET | Refills: 3 | Status: ACTIVE | COMMUNITY

## 2025-05-27 RX ORDER — POLYETHYLENE GLYCOL 3350, SODIUM SULFATE, SODIUM CHLORIDE, POTASSIUM CHLORIDE, ASCORBIC ACID, SODIUM ASCORBATE 140-9-5.2G
MIX AND USE DIRECTED.   USE CO-PAY CARD BELOW:  BIN 019158  PCN: CNRX GROUP: AC68037003 ID: 39275793763 KIT ORAL
Status: ACTIVE | COMMUNITY
Start: 2021-08-19

## 2025-05-27 RX ORDER — ALLOPURINOL 100 MG/1
TABLET ORAL
Status: ACTIVE | COMMUNITY
Start: 2019-11-18

## 2025-05-27 RX ORDER — YOHIMBE BARK 500 MG
CAPSULE ORAL
Status: ACTIVE | COMMUNITY
Start: 2021-07-21

## 2025-05-27 RX ORDER — OLMESARTAN MEDOXOMIL 40 MG/1
TABLET, FILM COATED ORAL
Status: ACTIVE | COMMUNITY
Start: 2019-11-18

## 2025-05-27 RX ORDER — OMEPRAZOLE 10 MG/1
CAPSULE, DELAYED RELEASE ORAL
Status: ACTIVE | COMMUNITY
Start: 2019-11-18

## 2025-05-27 RX ORDER — LINACLOTIDE 145 UG/1
1 CAPSULE AT LEAST 30 MINUTES BEFORE THE FIRST MEAL OF THE DAY ON AN EMPTY STOMACH CAPSULE, GELATIN COATED ORAL ONCE A DAY
Qty: 90 | Refills: 3 | OUTPATIENT
Start: 2025-05-27 | End: 2026-05-22

## 2025-05-27 RX ORDER — LINACLOTIDE 145 UG/1
1 CAPSULE PO QAM 30 MINS BEFORE EATING CAPSULE, GELATIN COATED ORAL
Status: ACTIVE | COMMUNITY
Start: 2021-07-21

## 2025-05-27 NOTE — HPI-TODAY'S VISIT:
Mr. Chris presents today with complaints of difficulty having bowel movements over the past week.  He reports he has been on Ozempic for almost 2 years.  He had a normal colonoscopy with me in September 2021.  He has also been having some right upper quadrant discomfort with this associated constipation.  Otherwise, he has no other GI complaints.

## 2025-06-05 ENCOUNTER — OFFICE VISIT (OUTPATIENT)
Dept: URBAN - METROPOLITAN AREA CLINIC 29 | Facility: CLINIC | Age: 60
End: 2025-06-05
Payer: COMMERCIAL

## 2025-06-05 ENCOUNTER — LAB OUTSIDE AN ENCOUNTER (OUTPATIENT)
Dept: URBAN - METROPOLITAN AREA CLINIC 29 | Facility: CLINIC | Age: 60
End: 2025-06-05

## 2025-06-05 DIAGNOSIS — R10.11 RUQ ABDOMINAL PAIN: ICD-10-CM

## 2025-06-05 PROBLEM — 301717006: Status: ACTIVE | Noted: 2025-06-05

## 2025-06-05 PROCEDURE — 99214 OFFICE O/P EST MOD 30 MIN: CPT | Performed by: INTERNAL MEDICINE

## 2025-06-05 RX ORDER — YOHIMBE BARK 500 MG
CAPSULE ORAL
Status: ACTIVE | COMMUNITY
Start: 2021-07-21

## 2025-06-05 RX ORDER — FAMOTIDINE 20 MG/1
TABLET ORAL
Status: ACTIVE | COMMUNITY
Start: 2019-11-18

## 2025-06-05 RX ORDER — LINACLOTIDE 145 UG/1
1 CAPSULE AT LEAST 30 MINUTES BEFORE THE FIRST MEAL OF THE DAY ON AN EMPTY STOMACH CAPSULE, GELATIN COATED ORAL ONCE A DAY
Qty: 30 | Refills: 3 | OUTPATIENT
Start: 2025-06-05 | End: 2025-10-03

## 2025-06-05 RX ORDER — GABAPENTIN 300 MG/1
BID CAPSULE ORAL
Status: ACTIVE | COMMUNITY
Start: 2015-06-08

## 2025-06-05 RX ORDER — METRONIDAZOLE 500 MG/1
1 TABLET TABLET ORAL THREE TIMES A DAY
Qty: 21 | OUTPATIENT
Start: 2025-06-05 | End: 2025-06-12

## 2025-06-05 RX ORDER — ROSUVASTATIN CALCIUM 10 MG
TABLET ORAL
Status: ACTIVE | COMMUNITY
Start: 2021-07-21

## 2025-06-05 RX ORDER — POLYETHYLENE GLYCOL 3350, SODIUM SULFATE, SODIUM CHLORIDE, POTASSIUM CHLORIDE, ASCORBIC ACID, SODIUM ASCORBATE 140-9-5.2G
MIX AND USE DIRECTED.   USE CO-PAY CARD BELOW:  BIN 019158  PCN: CNRX GROUP: AC68037003 ID: 39275793763 KIT ORAL
Status: ACTIVE | COMMUNITY
Start: 2021-08-19

## 2025-06-05 RX ORDER — PANTOPRAZOLE SODIUM 40 MG/1
TAKE 1 TABLET BY MOUTH TWICE  DAILY TABLET, DELAYED RELEASE ORAL
Qty: 180 TABLET | Refills: 3 | Status: ACTIVE | COMMUNITY

## 2025-06-05 RX ORDER — METFORMIN HCL 1000 MG/1
BID TABLET ORAL
Status: ACTIVE | COMMUNITY
Start: 2013-10-24

## 2025-06-05 RX ORDER — LINACLOTIDE 145 UG/1
1 CAPSULE PO QAM 30 MINS BEFORE EATING CAPSULE, GELATIN COATED ORAL
Status: ACTIVE | COMMUNITY
Start: 2021-07-21

## 2025-06-05 RX ORDER — ALLOPURINOL 100 MG/1
TABLET ORAL
Status: ACTIVE | COMMUNITY
Start: 2019-11-18

## 2025-06-05 RX ORDER — FUROSEMIDE 20 MG/1
QD TABLET ORAL
Status: ACTIVE | COMMUNITY
Start: 2015-06-08

## 2025-06-05 RX ORDER — OLMESARTAN MEDOXOMIL 40 MG/1
TABLET, FILM COATED ORAL
Status: ACTIVE | COMMUNITY
Start: 2019-11-18

## 2025-06-05 RX ORDER — CHLORHEXIDINE GLUCONATE 4 %
LIQUID (ML) TOPICAL
Status: ACTIVE | COMMUNITY
Start: 2021-07-21

## 2025-06-05 RX ORDER — OMEPRAZOLE 10 MG/1
CAPSULE, DELAYED RELEASE ORAL
Status: ACTIVE | COMMUNITY
Start: 2019-11-18

## 2025-06-05 RX ORDER — OMEGA-3S/DHA/EPA/FISH OIL 980-1400MG
CAPSULE,DELAYED RELEASE (ENTERIC COATED) ORAL
Status: ACTIVE | COMMUNITY
Start: 2021-07-21

## 2025-06-05 RX ORDER — MONTELUKAST 10 MG/1
QD TABLET, FILM COATED ORAL
Status: ACTIVE | COMMUNITY
Start: 2015-06-08

## 2025-06-05 RX ORDER — LINACLOTIDE 145 UG/1
1 CAPSULE AT LEAST 30 MINUTES BEFORE THE FIRST MEAL OF THE DAY ON AN EMPTY STOMACH CAPSULE, GELATIN COATED ORAL ONCE A DAY
Qty: 90 | Refills: 3 | Status: ACTIVE | COMMUNITY
Start: 2025-05-27 | End: 2026-05-22

## 2025-06-05 RX ORDER — INSULIN GLARGINE 100 [IU]/ML
INJECTION, SOLUTION SUBCUTANEOUS
Status: ACTIVE | COMMUNITY
Start: 2021-07-21

## 2025-06-05 RX ORDER — SEMAGLUTIDE 1.34 MG/ML
INJECTION, SOLUTION SUBCUTANEOUS
Status: ACTIVE | COMMUNITY
Start: 2021-07-21

## 2025-06-05 RX ORDER — DILTIAZEM HYDROCHLORIDE 240 MG/1
CAPSULE, EXTENDED RELEASE ORAL
Status: ACTIVE | COMMUNITY
Start: 2019-11-18

## 2025-06-05 RX ORDER — INSULIN GLARGINE 100 [IU]/ML
60 UNITS QD INJECTION, SOLUTION SUBCUTANEOUS
Status: ACTIVE | COMMUNITY
Start: 2015-06-08

## 2025-06-05 NOTE — PHYSICAL EXAM GASTROINTESTINAL
soft, mild tenderness to palpation, nondistended,  no guarding or rigidity,  no masses palpable,  normal bowel sounds,  no hepatosplenomegaly,  no rebound tenderness

## 2025-06-10 ENCOUNTER — TELEPHONE ENCOUNTER (OUTPATIENT)
Dept: URBAN - METROPOLITAN AREA CLINIC 27 | Facility: CLINIC | Age: 60
End: 2025-06-10

## 2025-06-10 RX ORDER — LINACLOTIDE 145 UG/1
1 CAPSULE AT LEAST 30 MINUTES BEFORE THE FIRST MEAL OF THE DAY ON AN EMPTY STOMACH CAPSULE, GELATIN COATED ORAL ONCE A DAY
Qty: 30 | Refills: 3
Start: 2025-06-05 | End: 2025-10-08

## 2025-06-11 ENCOUNTER — TELEPHONE ENCOUNTER (OUTPATIENT)
Dept: URBAN - METROPOLITAN AREA CLINIC 27 | Facility: CLINIC | Age: 60
End: 2025-06-11

## 2025-06-16 ENCOUNTER — TELEPHONE ENCOUNTER (OUTPATIENT)
Dept: URBAN - METROPOLITAN AREA CLINIC 29 | Facility: CLINIC | Age: 60
End: 2025-06-16

## 2025-07-08 ENCOUNTER — OFFICE VISIT (OUTPATIENT)
Dept: URBAN - METROPOLITAN AREA CLINIC 27 | Facility: CLINIC | Age: 60
End: 2025-07-08

## 2025-07-10 ENCOUNTER — OFFICE VISIT (OUTPATIENT)
Dept: URBAN - METROPOLITAN AREA CLINIC 29 | Facility: CLINIC | Age: 60
End: 2025-07-10

## 2025-08-04 ENCOUNTER — OFFICE VISIT (OUTPATIENT)
Dept: URBAN - METROPOLITAN AREA CLINIC 27 | Facility: CLINIC | Age: 60
End: 2025-08-04
Payer: COMMERCIAL

## 2025-08-04 DIAGNOSIS — K59.00 CONSTIPATION, UNSPECIFIED: ICD-10-CM

## 2025-08-04 PROCEDURE — 99214 OFFICE O/P EST MOD 30 MIN: CPT | Performed by: INTERNAL MEDICINE

## 2025-08-04 RX ORDER — DILTIAZEM HYDROCHLORIDE 240 MG/1
CAPSULE, EXTENDED RELEASE ORAL
Status: ACTIVE | COMMUNITY
Start: 2019-11-18

## 2025-08-04 RX ORDER — FUROSEMIDE 20 MG/1
QD TABLET ORAL
Status: ACTIVE | COMMUNITY
Start: 2015-06-08

## 2025-08-04 RX ORDER — GABAPENTIN 300 MG/1
BID CAPSULE ORAL
Status: ACTIVE | COMMUNITY
Start: 2015-06-08

## 2025-08-04 RX ORDER — LINACLOTIDE 145 UG/1
1 CAPSULE AT LEAST 30 MINUTES BEFORE THE FIRST MEAL OF THE DAY ON AN EMPTY STOMACH CAPSULE, GELATIN COATED ORAL ONCE A DAY
Qty: 30 | Refills: 3 | Status: ACTIVE | COMMUNITY
Start: 2025-06-05 | End: 2025-10-08

## 2025-08-04 RX ORDER — SEMAGLUTIDE 1.34 MG/ML
INJECTION, SOLUTION SUBCUTANEOUS
Status: ACTIVE | COMMUNITY
Start: 2021-07-21

## 2025-08-04 RX ORDER — ROSUVASTATIN CALCIUM 10 MG
TABLET ORAL
Status: ACTIVE | COMMUNITY
Start: 2021-07-21

## 2025-08-04 RX ORDER — ALLOPURINOL 100 MG/1
TABLET ORAL
Status: ACTIVE | COMMUNITY
Start: 2019-11-18

## 2025-08-04 RX ORDER — OLMESARTAN MEDOXOMIL 40 MG/1
TABLET, FILM COATED ORAL
Status: ACTIVE | COMMUNITY
Start: 2019-11-18

## 2025-08-04 RX ORDER — FAMOTIDINE 20 MG/1
TABLET ORAL
Status: ACTIVE | COMMUNITY
Start: 2019-11-18

## 2025-08-04 RX ORDER — PANTOPRAZOLE SODIUM 40 MG/1
TAKE 1 TABLET BY MOUTH TWICE  DAILY TABLET, DELAYED RELEASE ORAL
Qty: 180 TABLET | Refills: 3 | Status: ACTIVE | COMMUNITY

## 2025-08-04 RX ORDER — CHLORHEXIDINE GLUCONATE 4 %
LIQUID (ML) TOPICAL
Status: ACTIVE | COMMUNITY
Start: 2021-07-21

## 2025-08-04 RX ORDER — LINACLOTIDE 145 UG/1
1 CAPSULE AT LEAST 30 MINUTES BEFORE THE FIRST MEAL OF THE DAY ON AN EMPTY STOMACH CAPSULE, GELATIN COATED ORAL ONCE A DAY
Qty: 90 | Refills: 3 | Status: ACTIVE | COMMUNITY
Start: 2025-05-27 | End: 2026-05-22

## 2025-08-04 RX ORDER — LINACLOTIDE 72 UG/1
1 CAPSULE AT LEAST 30 MINUTES BEFORE THE FIRST MEAL OF THE DAY ON AN EMPTY STOMACH CAPSULE, GELATIN COATED ORAL ONCE A DAY
Qty: 30 | Refills: 3 | OUTPATIENT
Start: 2025-08-04 | End: 2025-12-02

## 2025-08-04 RX ORDER — METFORMIN HCL 1000 MG/1
BID TABLET ORAL
Status: ACTIVE | COMMUNITY
Start: 2013-10-24

## 2025-08-04 RX ORDER — MONTELUKAST 10 MG/1
QD TABLET, FILM COATED ORAL
Status: ACTIVE | COMMUNITY
Start: 2015-06-08

## 2025-08-04 RX ORDER — OMEGA-3S/DHA/EPA/FISH OIL 980-1400MG
CAPSULE,DELAYED RELEASE (ENTERIC COATED) ORAL
Status: ACTIVE | COMMUNITY
Start: 2021-07-21

## 2025-08-04 RX ORDER — LINACLOTIDE 145 UG/1
1 CAPSULE PO QAM 30 MINS BEFORE EATING CAPSULE, GELATIN COATED ORAL
Status: ACTIVE | COMMUNITY
Start: 2021-07-21

## 2025-08-04 RX ORDER — OMEPRAZOLE 10 MG/1
CAPSULE, DELAYED RELEASE ORAL
Status: ACTIVE | COMMUNITY
Start: 2019-11-18

## 2025-08-04 RX ORDER — INSULIN GLARGINE 100 [IU]/ML
60 UNITS QD INJECTION, SOLUTION SUBCUTANEOUS
Status: ACTIVE | COMMUNITY
Start: 2015-06-08

## 2025-08-04 RX ORDER — POLYETHYLENE GLYCOL 3350, SODIUM SULFATE, SODIUM CHLORIDE, POTASSIUM CHLORIDE, ASCORBIC ACID, SODIUM ASCORBATE 140-9-5.2G
MIX AND USE DIRECTED.   USE CO-PAY CARD BELOW:  BIN 019158  PCN: CNRX GROUP: AC68037003 ID: 39275793763 KIT ORAL
Status: ACTIVE | COMMUNITY
Start: 2021-08-19

## 2025-08-04 RX ORDER — INSULIN GLARGINE 100 [IU]/ML
INJECTION, SOLUTION SUBCUTANEOUS
Status: ACTIVE | COMMUNITY
Start: 2021-07-21

## 2025-08-04 RX ORDER — YOHIMBE BARK 500 MG
CAPSULE ORAL
Status: ACTIVE | COMMUNITY
Start: 2021-07-21